# Patient Record
Sex: FEMALE | Race: WHITE
[De-identification: names, ages, dates, MRNs, and addresses within clinical notes are randomized per-mention and may not be internally consistent; named-entity substitution may affect disease eponyms.]

---

## 2020-10-29 ENCOUNTER — HOSPITAL ENCOUNTER (OUTPATIENT)
Dept: HOSPITAL 62 - RAD | Age: 70
End: 2020-10-29
Attending: PODIATRIST
Payer: MEDICARE

## 2020-10-29 DIAGNOSIS — M85.871: Primary | ICD-10-CM

## 2020-10-29 DIAGNOSIS — M84.374A: ICD-10-CM

## 2020-12-24 ENCOUNTER — HOSPITAL ENCOUNTER (EMERGENCY)
Dept: HOSPITAL 62 - ER | Age: 70
Discharge: HOME | End: 2020-12-24
Payer: MEDICARE

## 2020-12-24 VITALS — DIASTOLIC BLOOD PRESSURE: 66 MMHG | SYSTOLIC BLOOD PRESSURE: 132 MMHG

## 2020-12-24 DIAGNOSIS — Z20.828: ICD-10-CM

## 2020-12-24 DIAGNOSIS — R35.0: ICD-10-CM

## 2020-12-24 DIAGNOSIS — R39.15: ICD-10-CM

## 2020-12-24 DIAGNOSIS — R39.198: ICD-10-CM

## 2020-12-24 DIAGNOSIS — Z79.899: ICD-10-CM

## 2020-12-24 DIAGNOSIS — Z88.8: ICD-10-CM

## 2020-12-24 DIAGNOSIS — M54.5: ICD-10-CM

## 2020-12-24 DIAGNOSIS — R30.0: ICD-10-CM

## 2020-12-24 DIAGNOSIS — N39.0: Primary | ICD-10-CM

## 2020-12-24 LAB
APPEARANCE UR: (no result)
APTT PPP: (no result) S
BILIRUB UR QL STRIP: NEGATIVE
GLUCOSE UR STRIP-MCNC: NEGATIVE MG/DL
KETONES UR STRIP-MCNC: (no result) MG/DL
NITRITE UR QL STRIP: POSITIVE
PH UR STRIP: 8 [PH] (ref 5–9)
PROT UR STRIP-MCNC: >=500 MG/DL
SP GR UR STRIP: 1.02
UROBILINOGEN UR-MCNC: 4 MG/DL (ref ?–2)

## 2020-12-24 PROCEDURE — 99283 EMERGENCY DEPT VISIT LOW MDM: CPT

## 2020-12-24 PROCEDURE — 87088 URINE BACTERIA CULTURE: CPT

## 2020-12-24 PROCEDURE — 81001 URINALYSIS AUTO W/SCOPE: CPT

## 2020-12-24 PROCEDURE — 87186 SC STD MICRODIL/AGAR DIL: CPT

## 2020-12-24 PROCEDURE — 87086 URINE CULTURE/COLONY COUNT: CPT

## 2020-12-24 NOTE — ER DOCUMENT REPORT
ED Medical Screen (RME)





- General


Chief Complaint: Urinary Problem


Stated Complaint: URINARY PROBLEM


Time Seen by Provider: 20 12:46


Primary Care Provider: 


SOLOMON BHAT DPM [Primary Care Provider] - Follow up in 3-5 days





- [facility]


Notes: 





20 15:39





70-year-old female presents to the emergency room today for frequency, urgency 

dysuria which started 2 days ago.  Pain is 3 out of 5, burning with urination.  

Tried AZO without relief.  she did place herself on AZO without relief.  Patient

states she did test positive for Covid on 2020.  reports some  lower back 

pain.  Reports it feels similar to her previous UTIs.  Eating and drinking 

without any issues.  








I have greeted and performed a rapid initial assessment of this patient.  A 

comprehensive ED assessment and evaluation of the patient, analysis of test 

results and completion of the medical decision making process will be conducted 

by additional ED providers.





PHYSICAL EXAMINATION:





GENERAL: Well-appearing, well-nourished and in no acute distress.





CV: s1, s2 regular 





LUNGS: No respiratory distress





abd: no abd tenderness, no cva tenderness 








Due to patient's urinalysis showing proteinuria well over 500, ketones, positive

nitrates and small leukesterase, patient likely has a pyelonephritis and needs 

further blood work done and possible IV abx.  Patient will be seen in the back 

by a main side provider





The patient was evaluated during a global COVID-19 pandemic and that diagnosis 

was suspected/considered upon their initial presentation.  Their evaluation, 

treatment and testing was consistent with current guidelines for patients who 

present with complaints or symptoms and may be related to COVID-19.





20 15:46








- Related Data


Allergies/Adverse Reactions: 


                                        





codeine Allergy (Verified 20 12:46)


   








Home Medications: omeprazole,azo, vitamin c, b12,vitamin d





Past Medical History





- Social History


Chew tobacco use (# tins/day): No


Frequency of alcohol use: None


Drug Abuse: None


Past Surgical History: Reports: Hx  Section





Physical Exam





- Vital signs


Vitals: 





                                        











Temp Pulse Resp BP Pulse Ox


 


 98.6 F   96   16   147/81 H  97 


 


 20 11:22  20 11:22  20 11:22  20 11:22  12/24/20 11:22














Course





- Vital Signs


Vital signs: 





                                        











Temp Pulse Resp BP Pulse Ox


 


 98.6 F   96   16   147/81 H  97 


 


 20 11:22  20 11:22  20 11:22  20 11:22  20 11:22














- Laboratory Results


Laboratory Results Interpreted: 





                                        











  20





  13:14


 


Urine Protein  >=500 H


 


Urine Ketones  TRACE H


 


Urine Nitrite  POSITIVE H


 


Urine Urobilinogen  4.0 H


 


Ur Leukocyte Esterase  SMALL H


 


Urine Ascorbic Acid  40 H














Doctor's Discharge





- Discharge


Clinical Impression: 


 UTI (urinary tract infection)





Condition: Stable


Disposition: HOME, SELF-CARE


Instructions:  Cephalexin ([facility]), Urinary Tract Infection ([facility])


Referrals: 


SLOOMON BHAT DPM [Primary Care Provider] - Follow up in 3-5 days

## 2020-12-24 NOTE — ER DOCUMENT REPORT
HPI





- HPI


Time Seen by Provider: 12/24/20 12:46


Notes: 





70-year-old female presents to the emergency room today for frequency, urgency 

dysuria which started 2 days ago.  Pain is 3 out of 5, burning with urination.  

She did place herself on AZO without relief.  Patient states she did test positi

ve for Covid on 12/19/2020.  Denies any lower back pain.  Reports it feels 

similar to her previous UTIs.  Eating and drinking without any issues.  Denies 

fevers, chills,  chest pain,palpitations,  shortness of breath, dyspnea, nausea,

vomiting, diarrhea, abdominal pain, hematuria,blurred vision, double vision, 

loss of vision, speech changes, LH, dizziness, syncope, headaches, wheezing, ST,

URI, neck pain, weakness, bowel or bladder dysfunction, saddle anesthesia, 

numbness or tingling in bilateral upper or lower extremities equally, muscle 

paralysis, weakness in bilateral upper or lower extremities equally or rash.





Past Medical History





- General


Information source: Patient





- Social History


Smoking Status: Unknown if Ever Smoked


Family History: Reviewed & Not Pertinent





Vertical Provider Document





- CONSTITUTIONAL


Agree With Documented VS: Yes


Exam Limitations: No Limitations


General Appearance: WD/WN


Notes: 








MEDICATIONS: I agree with the patient medications as charted by the RN.





ALLERGIES: I agree with the allergies as charted by the RN.





PAST MEDICAL HISTORY/PAST SURGICAL HISTORY: Reviewed and agree as charted by RN.





SOCIAL HISTORY: Reviewed and agree as charted by RN.





FAMILY HISTORY: No significant familial comorbid conditions directly related to 

patient complaint





EXAM:


Reviewed vital signs as charted by RN.





PHYSICAL EXAMINATION: reviewed vital signs by RN





GENERAL: Well-appearing, well-nourished and in no acute distress.





HEAD: Atraumatic, normocephalic.





EYES: Pupils equal round and reactive to light, extraocular movements intact, 

conjunctiva are normal.





ENT: Nares patent, oropharynx clear without exudates.  Moist mucous membranes.





NECK: Normal range of motion, supple without lymphadenopathy





LUNGS: Breath sounds clear to auscultation bilaterally and equal.  No wheezes 

rales or rhonchi.





HEART: Regular rate and rhythm without murmurs





ABDOMEN: Soft, nontender, suprapubic tenderness nondistended abdomen.  No 

guarding, no rebound.  No masses appreciated.  No CVA tenderness appreciated 

bilaterally





Female : deferred





Musculoskeletal: Normal range of motion, no pitting or edema.  No cyanosis.





NEUROLOGICAL: Cranial nerves grossly intact.  Normal speech, normal gait.  

Normal sensory, motor exams





PSYCH: Normal mood, normal affect.





SKIN: Warm, Dry, normal turgor, no rashes or lesions noted.





Course





- Re-evaluation


Re-evalutation: 





12/24/20 12:54


Afebrile vital stable no distress.  Nurses notes reviewed.  Urinalysis shows





- Vital Signs


Vital signs: 


                                        











Temp Pulse Resp BP Pulse Ox


 


 98.6 F   96   16   147/81 H  97 


 


 12/24/20 11:22  12/24/20 11:22  12/24/20 11:22  12/24/20 11:22  12/24/20 11:22














Discharge





- Discharge


Clinical Impression: 


 UTI (urinary tract infection)





Condition: Stable


Disposition: HOME, SELF-CARE


Instructions:  Urinary Tract Infection ([facility]), Cephalexin ([facility])


Referrals: 


SOLOMON BHAT DPM [Primary Care Provider] - Follow up in 3-5 days